# Patient Record
Sex: FEMALE | Race: WHITE | NOT HISPANIC OR LATINO | Employment: OTHER | URBAN - NONMETROPOLITAN AREA
[De-identification: names, ages, dates, MRNs, and addresses within clinical notes are randomized per-mention and may not be internally consistent; named-entity substitution may affect disease eponyms.]

---

## 2023-02-09 ENCOUNTER — OFFICE VISIT (OUTPATIENT)
Dept: PULMONOLOGY | Facility: CLINIC | Age: 73
End: 2023-02-09
Payer: MEDICARE

## 2023-02-09 VITALS
DIASTOLIC BLOOD PRESSURE: 88 MMHG | HEIGHT: 63 IN | OXYGEN SATURATION: 96 % | SYSTOLIC BLOOD PRESSURE: 118 MMHG | BODY MASS INDEX: 18.07 KG/M2 | TEMPERATURE: 97.8 F | HEART RATE: 92 BPM | WEIGHT: 102 LBS

## 2023-02-09 DIAGNOSIS — R63.6 UNDERWEIGHT: ICD-10-CM

## 2023-02-09 DIAGNOSIS — J44.9 CHRONIC OBSTRUCTIVE PULMONARY DISEASE, UNSPECIFIED COPD TYPE: Primary | ICD-10-CM

## 2023-02-09 DIAGNOSIS — R91.8 LUNG MASS: ICD-10-CM

## 2023-02-09 PROCEDURE — 99204 OFFICE O/P NEW MOD 45 MIN: CPT | Performed by: NURSE PRACTITIONER

## 2023-02-09 PROCEDURE — 3023F SPIROM DOC REV: CPT | Performed by: NURSE PRACTITIONER

## 2023-02-09 RX ORDER — HYDROCHLOROTHIAZIDE 12.5 MG/1
12.5 TABLET ORAL DAILY
COMMUNITY

## 2023-02-09 RX ORDER — ALPRAZOLAM 0.25 MG/1
TABLET ORAL
COMMUNITY
Start: 2023-01-13

## 2023-02-09 RX ORDER — ERGOCALCIFEROL 1.25 MG/1
CAPSULE ORAL
COMMUNITY

## 2023-02-09 RX ORDER — POTASSIUM CHLORIDE 20 MEQ/1
TABLET, EXTENDED RELEASE ORAL
COMMUNITY

## 2023-02-09 RX ORDER — DILTIAZEM HYDROCHLORIDE 180 MG/1
CAPSULE, COATED, EXTENDED RELEASE ORAL
COMMUNITY
Start: 2023-02-03

## 2023-02-09 RX ORDER — OXYCODONE AND ACETAMINOPHEN 7.5; 325 MG/1; MG/1
TABLET ORAL
COMMUNITY
Start: 2023-01-13

## 2023-02-09 RX ORDER — ALBUTEROL SULFATE 90 UG/1
AEROSOL, METERED RESPIRATORY (INHALATION)
COMMUNITY

## 2023-02-09 RX ORDER — BUDESONIDE AND FORMOTEROL FUMARATE DIHYDRATE 160; 4.5 UG/1; UG/1
AEROSOL RESPIRATORY (INHALATION)
COMMUNITY
Start: 2023-01-13

## 2023-02-09 RX ORDER — PANTOPRAZOLE SODIUM 40 MG/1
TABLET, DELAYED RELEASE ORAL
COMMUNITY

## 2023-02-09 RX ORDER — IPRATROPIUM/ALBUTEROL SULFATE 20-100 MCG
MIST INHALER (GRAM) INHALATION
COMMUNITY

## 2023-02-09 RX ORDER — LISINOPRIL 20 MG/1
TABLET ORAL
COMMUNITY
Start: 2023-01-13

## 2023-02-09 RX ORDER — MELOXICAM 15 MG/1
TABLET ORAL
COMMUNITY
Start: 2023-01-13

## 2023-02-09 NOTE — PROGRESS NOTES
Chief Complaint  Abnormal CT    Subjective        Cecilia Ordoñez presents to [facility] PULMONARY & CRITICAL CARE MEDICINE  History of Present Illness     Ms. Ordoñez is a 72 year old female with a medical history significant for anxiety, GERD, rheumatoid arthritis, COPD, rectal and vaginal squamous cell carcinoma.    She presents today for evaluation of an abnormal CT chest.  I have obtained records for review.  It is noted that the patient was hospitalized in October 2022 due to elevated CPK level and elevated creatinine.  She has been found at home on the floor by her son.  She had stated at this time that she had some nausea, vomiting and diarrhea prior to being admitted to the hospital.  During her hospitalization she was found to be hypoxic and was felt to have pneumonia noted on chest x-ray.  Also noted on chest x-ray at the time of hospitalization was a 1.8 cm pulmonary nodule in the right upper lobe.  CT chest was obtained to better evaluate pneumonia and was noted to show multifocal bilateral pulmonary infiltrates with cavitation.  During her hospitalization she saw infectious disease as well as an oncologist who recommended to try to look into cavitary lesion noted on CT chest.  Pulmonology recommended bronchoscopy.  She underwent bronchoscopy on October 11, 2022 which showed no endobronchial lesions.  Mucous plugs were noted in the right lower lobe and left lower lobe.  They were removed and treated with multiple irrigations.  Patient underwent bilateral washings and brush biopsy of the right upper lobe.  Cytology of the right upper lobe washing showed no malignancy and no evidence of any fungal organisms as well as no evidence of acid-fast organisms.  Bilateral lung washings also showed negative for malignancy.  A third brushing of the right upper wound showed atypical epithelial cells.  At this time a CT-guided biopsy was recommended if clinically indicated after resolution of  "pneumonia.  She is presented today to follow-up on the outpatient basis for further follow-up on this lesion.  She has since underwent another chest x-ray on 1/13/2023 which showed that this lung nodule/cavitary lesion appears to be enlarging.  She reports that she is currently taking albuterol as needed, Symbicort twice daily and Combivent every 4 hours.  She reports that she does have shortness of breath but that is currently at baseline.  She also reports recurrence of her rectal and vaginal squamous cell carcinoma.  She states that she has been following with Dr. Murguia for [facility] but has not received any treatment at this time.  She is a current smoker of about 1 pack/day.    Objective   Vital Signs:  /88   Pulse 92   Temp 97.8 °F (36.6 °C) (Temporal)   Ht 160 cm (63\")   Wt 46.3 kg (102 lb)   SpO2 96%   BMI 18.07 kg/m²   Estimated body mass index is 18.07 kg/m² as calculated from the following:    Height as of this encounter: 160 cm (63\").    Weight as of this encounter: 46.3 kg (102 lb).       BMI is below normal parameters (malnutrition). Recommendations: treating the underlying disease process      Physical Exam     GENERAL APPEARANCE: Well developed, Chronically ill appearing, alert and cooperative, and appears to be in no acute distress.    HEAD: normocephalic. Atraumatic.    EYES: PERRL, EOMI. Vision is grossly intact.    THROAT: Oral cavity and pharynx normal. No inflammation, swelling, exudate, or lesions.     NECK: Neck supple.  No thyromegaly.    CARDIAC: Normal S1 and S2. No S3, S4 or murmurs. Rhythm is regular.     RESPIRATORY:Bilateral air entry positive. Bilateral diminished breath sounds. No wheezing, crackles or rhonchi noted.    GI: Positive bowel sounds. Soft, nondistended, nontender.     MUSCULOSKELETAL: No significant deformity or joint abnormality. No edema. Peripheral pulses intact. No varicosities.    NEUROLOGICAL: Strength and sensation symmetric and intact " throughout.     PSYCHIATRIC: The mental examination revealed the patient was oriented to person, place, and time.     Result Review :  The following data was reviewed by: SILVIA Gagnon on 02/09/2023:                   Assessment and Plan   Diagnoses and all orders for this visit:    1. Chronic obstructive pulmonary disease, unspecified COPD type (HCC) (Primary)    2. Lung mass  -     NM PET/CT Skull Base to Mid Thigh; Future    3. Underweight        Spirometry was completed in office.  FEV1/FVC ratio was noted to be 79.  FEV1 was noted to be 67%.  Study is likely inaccurate as patient does not breathing out for 6 seconds.    After review of her records believe that it is best to approach with a PET/CT for further evaluation.  If PET/CT should come back positive we will consider obtaining a CT-guided biopsy.    She is a current smoker of 1 pack/day and states that she has cut down from 3 packs/day.    Cecilia Ordoñez  reports that she has been smoking cigarettes. She has been smoking an average of 1 pack per day. She has never used smokeless tobacco.. I have educated her on the risk of diseases from using tobacco products such as cancer, COPD and heart disease.     I advised her to quit and she is not willing to quit.    Continue Symbicort twice daily.  Continue Combivent every 4 hours.  Continue albuterol as needed.       Follow Up   Return in about 6 weeks (around 3/23/2023).  Patient was given instructions and counseling regarding her condition or for health maintenance advice. Please see specific information pulled into the AVS if appropriate.

## 2023-02-10 ENCOUNTER — PATIENT ROUNDING (BHMG ONLY) (OUTPATIENT)
Dept: PULMONOLOGY | Facility: CLINIC | Age: 73
End: 2023-02-10
Payer: MEDICARE

## 2023-02-10 NOTE — PROGRESS NOTES
February 10, 2023    Hello, may I speak with Cecilia Ordoñez?    My name is Rubina Cabrera      I am  with [facility]  [address]  [place] [address] [phone].    Before we get started may I verify your date of birth? 1950    I am calling to officially welcome you to our practice and ask about your recent visit. Is this a good time to talk? yes    Tell me about your visit with us. What things went well?  It was a nice visit, everyone was kind        We're always looking for ways to make our patients' experiences even better. Do you have recommendations on ways we may improve?  no    Overall were you satisfied with your first visit to our practice? yes       I appreciate you taking the time to speak with me today. Is there anything else I can do for you? no      Thank you, and have a great day.

## 2023-03-07 ENCOUNTER — HOSPITAL ENCOUNTER (OUTPATIENT)
Dept: PET IMAGING | Facility: HOSPITAL | Age: 73
Discharge: HOME | End: 2023-03-07
Admitting: NURSE PRACTITIONER
Payer: MEDICARE

## 2023-03-07 DIAGNOSIS — R91.8 LUNG MASS: ICD-10-CM

## 2023-03-07 PROCEDURE — A9552 F18 FDG: HCPCS | Performed by: NURSE PRACTITIONER

## 2023-03-07 PROCEDURE — 0 FLUDEOXYGLUCOSE F18 SOLUTION: Performed by: NURSE PRACTITIONER

## 2023-03-07 PROCEDURE — 78815 PET IMAGE W/CT SKULL-THIGH: CPT

## 2023-03-07 PROCEDURE — 78815 PET IMAGE W/CT SKULL-THIGH: CPT | Performed by: RADIOLOGY

## 2023-03-07 RX ADMIN — FLUDEOXYGLUCOSE F18 1 DOSE: 300 INJECTION INTRAVENOUS at 11:47

## 2023-03-20 ENCOUNTER — OFFICE VISIT (OUTPATIENT)
Dept: PULMONOLOGY | Facility: CLINIC | Age: 73
End: 2023-03-20
Payer: MEDICARE

## 2023-03-20 VITALS
TEMPERATURE: 98 F | HEART RATE: 79 BPM | RESPIRATION RATE: 18 BRPM | OXYGEN SATURATION: 92 % | SYSTOLIC BLOOD PRESSURE: 100 MMHG | DIASTOLIC BLOOD PRESSURE: 80 MMHG

## 2023-03-20 DIAGNOSIS — C44.92 SQUAMOUS CELL SKIN CANCER: ICD-10-CM

## 2023-03-20 DIAGNOSIS — R91.8 LUNG MASS: Primary | ICD-10-CM

## 2023-03-20 DIAGNOSIS — J96.11 CHRONIC RESPIRATORY FAILURE WITH HYPOXIA: ICD-10-CM

## 2023-03-20 DIAGNOSIS — J44.9 COPD MIXED TYPE: ICD-10-CM

## 2023-03-20 DIAGNOSIS — R06.09 DOE (DYSPNEA ON EXERTION): ICD-10-CM

## 2023-03-20 DIAGNOSIS — Z72.0 TOBACCO ABUSE: ICD-10-CM

## 2023-03-20 PROBLEM — E43 SEVERE MALNUTRITION: Status: ACTIVE | Noted: 2023-03-20

## 2023-03-20 PROCEDURE — 1159F MED LIST DOCD IN RCRD: CPT | Performed by: INTERNAL MEDICINE

## 2023-03-20 PROCEDURE — 99215 OFFICE O/P EST HI 40 MIN: CPT | Performed by: INTERNAL MEDICINE

## 2023-03-20 PROCEDURE — 1160F RVW MEDS BY RX/DR IN RCRD: CPT | Performed by: INTERNAL MEDICINE

## 2023-03-20 NOTE — PROGRESS NOTES
"  PULMONARY  NOTE    Chief Complaint     Hypermetabolic lung lesion, severe COPD, ongoing tobacco abuse, severe malnutrition, debility, dyspnea on exertion, history of squamous cell carcinoma of the rectum and vagina    History of Present Illness     72-year-old female returns today for follow-up  She last saw SILVIA Fernando on 2/9/2023    She has a long history of tobacco abuse which is ongoing  She is cut down to 1 pack/day and at this time does not have plans for smoking cessation    Last fall she was found to have a 1.8 cm right upper lobe lesion  She underwent a bronchoscopy which revealed no endobronchial lesions and a brush from the right upper lobe revealed atypia    By her report she has a history of squamous cell cancer of her \"groin\" that has invaded her rectum and her vagina  She has undergone excision at [facility] in the past  She had recurrence after about a year and has been followed by Dr. Murguia in [place] who has apparently done some excisions in the past, as well  She has not recently been back to see Dr. Murguia but complains of increasing pain and skin changes in her groin    After seeing SILVIA Fernando the patient underwent a PET CT scan  Those results are as noted below    She has lifestyle limiting dyspnea  Also chronic groin and hip pain  For those reasons she gets around in a wheelchair any distance    Patient Active Problem List   Diagnosis   • Severe COPD   • Tobacco abuse   • RUL hypermetabolic lung mass (Atypia on bronch brush)   • SANTILLAN (dyspnea on exertion)   • Squamous cell skin (?) cancer with vaginal, rectal involvement   • Severe malnutrition (HCC)   • Chronic respiratory failure with hypoxia (HCC)     Allergies   Allergen Reactions   • Androgens Hives   • Aspirin Nausea And Vomiting   • Penicillins Rash   • Prednisone & Diphenhydramine Hives   • Sulfa Antibiotics Rash and Hives       Current Outpatient Medications:   •  albuterol sulfate  (90 Base) MCG/ACT " inhaler, albuterol sulfate HFA 90 mcg/actuation aerosol inhaler, Disp: , Rfl:   •  ALPRAZolam (XANAX) 0.25 MG tablet, alprazolam 0.25 mg tablet, Disp: , Rfl:   •  budesonide-formoterol (SYMBICORT) 160-4.5 MCG/ACT inhaler, Q12H, Disp: , Rfl:   •  dilTIAZem CD (CARDIZEM CD) 180 MG 24 hr capsule, , Disp: , Rfl:   •  ergocalciferol (ERGOCALCIFEROL) 1.25 MG (23767 UT) capsule, ergocalciferol (vitamin D2) 1,250 mcg (50,000 unit) capsule  Take 1 capsule twice a week by oral route for 28 days., Disp: , Rfl:   •  hydroCHLOROthiazide (HYDRODIURIL) 12.5 MG tablet, Take 1 tablet by mouth Daily., Disp: , Rfl:   •  ipratropium-albuterol (Combivent Respimat)  MCG/ACT inhaler, Combivent Respimat 20 mcg-100 mcg/actuation solution for inhalation, Disp: , Rfl:   •  lisinopril (PRINIVIL,ZESTRIL) 20 MG tablet, lisinopril 20 mg tablet, Disp: , Rfl:   •  meloxicam (MOBIC) 15 MG tablet, meloxicam 15 mg tablet, Disp: , Rfl:   •  metoprolol tartrate (LOPRESSOR) 25 MG tablet, Every 12 (Twelve) Hours., Disp: , Rfl:   •  oxyCODONE-acetaminophen (PERCOCET) 7.5-325 MG per tablet, oxycodone-acetaminophen 7.5 mg-325 mg tablet, Disp: , Rfl:   •  pantoprazole (PROTONIX) 40 MG EC tablet, pantoprazole 40 mg tablet,delayed release, Disp: , Rfl:   •  potassium chloride (K-DUR,KLOR-CON) 20 MEQ CR tablet, potassium chloride ER 20 mEq tablet,extended release(part/cryst), Disp: , Rfl:   MEDICATION LIST AND ALLERGIES REVIEWED.    Family History   Problem Relation Age of Onset   • Stroke Mother    • Heart failure Father    • Cancer Paternal Aunt    • Cancer Maternal Grandmother      Social History     Tobacco Use   • Smoking status: Every Day     Packs/day: 1.00     Types: Cigarettes   • Smokeless tobacco: Never   Vaping Use   • Vaping Use: Never used   Substance Use Topics   • Alcohol use: Never   • Drug use: Never     Social History     Social History Narrative    Ongoing tobacco abuse    Cut down to 1 pack/day    No plans for smoking cessation      FAMILY AND SOCIAL HISTORY REVIEWED.    Review of Systems  ALSO REFER TO SCANNED ROS SHEET FROM SAME DATE.    /80   Pulse 79   Temp 98 °F (36.7 °C) (Temporal)   Resp 18   SpO2 92%   Physical Exam  Vitals and nursing note reviewed.   Constitutional:       General: She is not in acute distress.     Appearance: She is well-developed. She is not diaphoretic.   HENT:      Head: Normocephalic and atraumatic.   Neck:      Thyroid: No thyromegaly.   Cardiovascular:      Rate and Rhythm: Normal rate and regular rhythm.      Heart sounds: Normal heart sounds. No murmur heard.  Pulmonary:      Effort: Pulmonary effort is normal.      Breath sounds: No stridor.      Comments: Decreased breath sounds bilaterally with a few scattered wheezes  Lymphadenopathy:      Cervical: No cervical adenopathy.      Upper Body:      Right upper body: No supraclavicular or epitrochlear adenopathy.      Left upper body: No supraclavicular or epitrochlear adenopathy.   Skin:     General: Skin is warm and dry.   Neurological:      Mental Status: She is alert and oriented to person, place, and time.      Comments: Got around the office today in a wheelchair due to dyspnea and fatigue   Psychiatric:         Behavior: Behavior normal.         Results     PET CT scan reviewed on PACS  Intensely hypermetabolic left upper lobe lesion    Previous PFTs revealed an FEV1 of 1.35 L    Immunization History   Administered Date(s) Administered   • COVID-19 (DAPHNE) 04/07/2021   • Pneumococcal Conjugate 20-Valent (PCV20) 09/01/2022   • Td 05/20/2010, 03/04/2021   • Td, Not Adsorbed 03/04/2021     Problem List       ICD-10-CM ICD-9-CM   1. RUL hypermetabolic lung mass (Atypia on bronch brush)  R91.8 786.6   2. Severe COPD  J44.9 496   3. Chronic respiratory failure with hypoxia (HCC)  J96.11 518.83     799.02   4. SANTILLAN (dyspnea on exertion)  R06.09 786.09   5. Squamous cell skin (?) cancer with vaginal, rectal involvement  C44.92 173.92   6. Tobacco  abuse  Z72.0 305.1       Discussion     We reviewed her PET scan and her test results to date    She has a approximately 2 cm right upper lobe lesion that exhibits intense abnormal hypermetabolic activity  Based on the [facility] lung nodule calculator there is a greater than 90% likelihood this represents a malignancy  The PET scan does not reveal abnormal hypermetabolic activity elsewhere    She has already undergone a routine bronchoscopy about 4 months ago which revealed atypia on a brush  This lesion would probably be amenable to a percutaneous needle biopsy but given her tenuous respiratory status a iatrogenic pneumothorax would carry a high likelihood for morbidity  A repeat bronchoscopy could be considered but this would need to be done with navigational assistance under general anesthesia and in [place] and would also be at significant risks given her debilitated state and advanced stage lung disease  Therefore I would recommend referral to radiation oncology and consideration of empiric SBRT    She has a lot of pain in her groin region which I did not examine today  The PET scan did not identify any focal abnormal hypermetabolic activity in this area  I have strongly encouraged her to follow-up with Dr. Murguia  Even if she does have regional recurrence of squamous cell carcinoma in her groin I think that the lung lesion is unlikely to be a metastatic focus    I strongly encouraged her smoking cessation we discussed smoking cessation strategies    She will remain on the same bronchodilators    I will see her back in a couple of months after she has been seen by Dr. Murguia and radiation oncology    Level of service justified based on 45 minutes spent in patient care on this date of service including, but not limited to: preparing to see the patient, obtaining and/or reviewing history, performing medically appropriate examination, ordering tests/medicine/procedures, independently interpreting results,  documenting clinical information in EHR, and counseling/education of patient/family/caregiver (excluding time spent on other separate services such as performing procedures or test interpretation, if applicable). (Level 4 30-39 minutes; Level 5 40-54 minutes)    Manuel Cramer MD  Note electronically signed    CC: Kathryn Schmitt, APRN

## 2023-03-28 ENCOUNTER — TELEPHONE (OUTPATIENT)
Dept: RADIATION ONCOLOGY | Facility: HOSPITAL | Age: 73
End: 2023-03-28
Payer: MEDICARE

## 2023-03-28 NOTE — TELEPHONE ENCOUNTER
Called and made patient aware of her Consult appointment on April 6th at 0900. She is aware that new patient paperwork has been mailed out, she can fill that out and bring with her to her appointment. She expressed full understanding with no questions at this time.

## 2023-04-06 ENCOUNTER — APPOINTMENT (OUTPATIENT)
Dept: RADIATION ONCOLOGY | Facility: HOSPITAL | Age: 73
End: 2023-04-06
Payer: MEDICARE

## 2023-04-06 ENCOUNTER — CONSULT (OUTPATIENT)
Dept: RADIATION ONCOLOGY | Facility: HOSPITAL | Age: 73
End: 2023-04-06
Payer: MEDICARE

## 2023-04-06 VITALS
OXYGEN SATURATION: 95 % | SYSTOLIC BLOOD PRESSURE: 85 MMHG | TEMPERATURE: 97.3 F | BODY MASS INDEX: 17.36 KG/M2 | RESPIRATION RATE: 18 BRPM | DIASTOLIC BLOOD PRESSURE: 58 MMHG | WEIGHT: 98 LBS | HEART RATE: 60 BPM

## 2023-04-06 DIAGNOSIS — R91.8 LUNG MASS: Primary | ICD-10-CM

## 2023-04-06 PROCEDURE — 1125F AMNT PAIN NOTED PAIN PRSNT: CPT | Performed by: RADIOLOGY

## 2023-04-06 PROCEDURE — 77263 THER RADIOLOGY TX PLNG CPLX: CPT | Performed by: RADIOLOGY

## 2023-04-06 PROCEDURE — 77334 RADIATION TREATMENT AID(S): CPT | Performed by: RADIOLOGY

## 2023-04-06 PROCEDURE — 99204 OFFICE O/P NEW MOD 45 MIN: CPT | Performed by: RADIOLOGY

## 2023-04-06 PROCEDURE — G0463 HOSPITAL OUTPT CLINIC VISIT: HCPCS | Performed by: RADIOLOGY

## 2023-04-06 NOTE — PROGRESS NOTES
New Patient Office Consult      Patient Name: Cecilia Ordoñez  : 1950   MRN: [id number]     Requesting Physician: Manuel Cramer,*    Chief Complaint:    Chief Complaint   Patient presents with   • Lung Cancer     Lung Mass    Clinical diagnosis of non-small cell lung cancer presenting as a right upper lobe peripheral nodule  Pathology: * Cannot find OR log *   Staging: [id number] stage 1     History of Present Illness: Cecilia Ordoñez is a pleasant 72 y.o. female who is here today for consultation regarding a newly discovered right lobe upper lobe nodule as detailed below.    Patient is a lifelong heavy smoker usually 2 to 3 packs/day and now down to about 1/2 pack/day but with no interest in quitting entirely.    On 2022 she was found by a neighbor on her floor complaining of nausea vomiting and diarrhea.  She was hospitalized and found to be hypoxic.  As part of her work-up a chest x-ray was obtained significant for 1.8 cm right upper lung nodule peripherally located.  This was followed by a chest CT which showed multifocal bilateral lung infiltrates with cavitation.    On  patient was seen by Dr. Jim, pulmonary service and infectious disease.  The oncologist recommended closer look at her cavitary lesion    Patient underwent bronchoscopy on 2022.  Koska B showed no endobronchial lesions kristi was sharp mucous plugs in the right lower lobe and the left lower lobe were removed and treated with irrigations.  Bilateral washings and brush biopsy were performed.  Cytology from the right upper lobe washing showed no malignancy and no evidence of fungal organisms.  A third brushing of the right upper lung however showed atypical epithelial cells present favor reactive bronchial epithelial cells.    CT-guided biopsy was recommended if clinically indicated after resolution of pneumonia.  She was discharged on 10/18/2022 with diagnosis of septic shock on admission due to  bacterial pneumonia.    Chest x-ray on 1/13/2023 showed redemonstration of peripheral right lung nodule suspicious for neoplasm PFTs obtained on 2/9/2023 showed an FEV1 of 67%, FEV1 over FVC of 0.79.    On 3/7/2023 patient underwent PET examination at [facility] in [place].  Exam was notable for spiculated right upper lung pulmonary nodule now 19 mm in size avid with a max SUV of 7.9 concerning for malignancy.  Other nodules including a spiculated right upper lobe subpleural nodule measuring 11 mm showed no hypermetabolism and may represent fibrosis.  More ill-defined right lower lobe nodule measuring 17 mm again had no hypermetabolism and again was thought to represent fibrosis.  A 1 cm spiculated left lower lobe nodule again showed no hypermetabolism.  Therefore the only avid area was the 19 mm right upper lung nodule peripherally located.    Patient relates that currently she is back to baseline she is diminished her smoking to half pack per day she is largely confined to a sitting position with chronic groin and hip pain use a wheelchair if she needs to look about any distance.    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 Red in [facility].  She has an appointment to see him again the 20 of this month and if contextually appropriate can consider radiation treatment.    Subjective      Review of Systems:      Constitutional no sweats stable weakness, largely wheelchair-bound when needs to look about any distance due to pain in groin and hip respiratory stable chronic shortness of breath with no recent exacerbation of the last month she denies pleuritic or constant chest pain she denies current cough or hemoptysis cardiovascular she denies palpitations chest pain change in heart rhythm syncope GI she denies current diarrhea vomiting constipation heartburn nausea  genitourinary she denies hematuria increased urinary frequency dysuria gynecological she is aware of recurrence of vaginal cancer as detailed above being managed by Dr. Red in [place] she does have pain but denies foul odor or discharge endocrine she denies cold intolerance heat intolerance excess thirst excess hunger musculoskeletal she has chronic pain in the left hip at a modest extent in the pubic region she denies claudication muscle pain decreased range of motion no history of trauma neurologic she is alert and oriented x4 she denies current problems with headache seizure activities change in mentation vision hearing.            I have reviewed and confirmed the accuracy of the ROS as documented by the MA/LPN/RN Orestes Lunsford MD     Past Oncology History:   Oncology/Hematology History    No history exists.        Past Radiation History:  No previous exposures to ionizing radiation therapy and there are not relative contraindications including connective tissue disorder.     Past Medical History:   Past Medical History:   Diagnosis Date   • Anxiety disorder    • GERD (gastroesophageal reflux disease)    • Gout    • Osteoarthritis    • Rheumatoid arthritis    • Skin cancer        Past Surgical History:   Past Surgical History:   Procedure Laterality Date   • BACK SURGERY     • BLADDER REPAIR     • HYSTERECTOMY     • SHOULDER SURGERY         Family History:   Family History   Problem Relation Age of Onset   • Stroke Mother    • Heart failure Father    • Cancer Paternal Aunt    • Cancer Maternal Grandmother        Social History:   Social History     Socioeconomic History   • Marital status:    Tobacco Use   • Smoking status: Every Day     Packs/day: 1.00     Types: Cigarettes   • Smokeless tobacco: Never   Vaping Use   • Vaping Use: Never used   Substance and Sexual Activity   • Alcohol use: Never   • Drug use: Never   • Sexual activity: Defer       Medications:     Current Outpatient  Medications:   •  albuterol sulfate  (90 Base) MCG/ACT inhaler, albuterol sulfate HFA 90 mcg/actuation aerosol inhaler, Disp: , Rfl:   •  ALPRAZolam (XANAX) 0.25 MG tablet, alprazolam 0.25 mg tablet, Disp: , Rfl:   •  budesonide-formoterol (SYMBICORT) 160-4.5 MCG/ACT inhaler, Q12H, Disp: , Rfl:   •  dilTIAZem CD (CARDIZEM CD) 180 MG 24 hr capsule, , Disp: , Rfl:   •  ergocalciferol (ERGOCALCIFEROL) 1.25 MG (83742 UT) capsule, ergocalciferol (vitamin D2) 1,250 mcg (50,000 unit) capsule  Take 1 capsule twice a week by oral route for 28 days., Disp: , Rfl:   •  hydroCHLOROthiazide (HYDRODIURIL) 12.5 MG tablet, Take 1 tablet by mouth Daily., Disp: , Rfl:   •  ipratropium-albuterol (Combivent Respimat)  MCG/ACT inhaler, Combivent Respimat 20 mcg-100 mcg/actuation solution for inhalation, Disp: , Rfl:   •  lisinopril (PRINIVIL,ZESTRIL) 20 MG tablet, lisinopril 20 mg tablet, Disp: , Rfl:   •  meloxicam (MOBIC) 15 MG tablet, meloxicam 15 mg tablet, Disp: , Rfl:   •  metoprolol tartrate (LOPRESSOR) 25 MG tablet, Every 12 (Twelve) Hours., Disp: , Rfl:   •  oxyCODONE-acetaminophen (PERCOCET) 7.5-325 MG per tablet, oxycodone-acetaminophen 7.5 mg-325 mg tablet, Disp: , Rfl:   •  pantoprazole (PROTONIX) 40 MG EC tablet, pantoprazole 40 mg tablet,delayed release, Disp: , Rfl:   •  potassium chloride (K-DUR,KLOR-CON) 20 MEQ CR tablet, potassium chloride ER 20 mEq tablet,extended release(part/cryst), Disp: , Rfl:     Allergies:   Allergies   Allergen Reactions   • Androgens Hives   • Aspirin Nausea And Vomiting   • Penicillins Rash   • Prednisone & Diphenhydramine Hives   • Sulfa Antibiotics Rash and Hives       KPS:70 %       Objective     Physical Exam:  Patient is extremely frail appearing elderly woman sitting in wheelchair.  She reports she is unable to climb on exam table and wishes examination today to be limited to the chest and abdomen pelvis as Dr. Red is handling issues relating to to her groin and  vagina.  She is examined with her son in attendance as chaperone    She is alert and oriented x3 HEENT normocephalic EOMs are full visual fields are full neck is supple there is no thyromegaly chest sounds are markedly diminished in all lung fields there is no DAO changes this no rubs there is no crackles.  There is no palpation percussion tenderness over the thoracic spine breasts are small nipples are everted there is no untoward masses in either breast or discharge there is no S3-S4 murmur rub is no hepatosplenomegaly no abdominal mass no JV distention no extremity edema cranial nerves II through XII are intact motor and sensory exams is grossly intact but I did not get her up to walk.      Vital Signs:   Vitals:    04/06/23 0850   BP: (!) 85/58   Pulse: 60   Resp: 18   Temp: 97.3 °F (36.3 °C)   TempSrc: Temporal   SpO2: 95%   Weight: 44.5 kg (98 lb)  Comment: Pt having hip pain; Stated weight from 2 days ago   PainSc: 10-Worst pain ever   PainLoc: Hip     Body mass index is 17.36 kg/m².       Assessment / Plan      Assessment/Plan:   No notes have been filed under this hospital service.  Service: Hospitalist       1. Our discussion today basically focused on her new right upper lung nodule.  I did explain however that after seeing Dr. Red on the 20th of this month and if indeed there is a recurrence of vaginal cancer then contextually she could be referred back to the radiation department for consideration of palliative radiotherapy to this region.  Therefore she is aware of this option for the future.  Of note her recent PET scan did not show any hypermetabolism in the vaginal groin area.    With respect to the right upper lung peripheral nodule R context is a woman who is quite frail with marginal PFTs who is clearly per Dr. Maria not considered a surgical candidate nor a safe candidate for further biopsy endeavors consequent risk of morbidity and mortality.    We therefore discussed 3 approaches first  proceeding nonetheless with further biopsy to pin down a diagnosis of non-small cell lung cancer of the right upper lung.  I agree with Dr. Maria that given the PET avidity of 7.9 in the right upper lung 19 mm nodule region combined with the [facility] nodule calculator showing more than 90% chance that this represents malignancy, the atypical cells from the right upper lung brushing and the risk intent and further biopsy approach this would be a poor choice.  The chance is overwhelming in context that this is a true malignancy but patient and son both understand that this has not been verified.    This being the case are to I believe realistic options are either proceed now with SBRT and ablate the small lesion versus close observation given her overall frail status with multiple comorbidities and do repetitive noncontrast CT scans every few months and a [place] with SBRT should it start to grow..  I believe both approaches would be reasonable in context.  After extensive discussion patient opted to treat at the present time and I think that is certainly reasonable choice.    I thus went over with her the alternatives pragmatics and possible short long-term side effects attending a course of SBRT.  Recommend that she receive 5 treatments given on an every other day basis to this region plus a small safety margin usually 5 to 8 mm depending on its movement as ascertained during the treatment planning process.  I have spoken to the physicist and dosimetrist here to make sure we conserve as much lung tissue as possible given her marginal reserves.  During treatment she may experience body aches and soreness from lying flat for 30 minutes on 5 occasions on a hard table, she may experience mild fatigue and mild skin redness.  Of more concern is the risk of radiation pneumonitis which I explained to her and in rare cases can be lethal side effect typically it is manifest a few weeks to few months after the radiation  and can show itself as temperature elevation cough and a washed out feeling.  Is also possible to have unrelenting chest pain albeit rare and injury to great vessels depending on particulars of the treatment approach.  In context this kind of treatment is usually well-tolerated even by patients who have marginal pulmonary reserve and in some cases that reserve can actually improve slightly as blood is diverted from narrating regions of malignancy to vital areas of lung tissue.  The most likely outcome is that she will not notice any difference in terms of her exercise capacity which again is marginal.    After extensive discussion patient and son would like to proceed with treatment they signed the consent will be simulated today to start the planning process.  Thank you for including us in her care.  We remain available to see her for the vaginal problem at the request of Dr. Red with with patient and son agree.      Follow Up:   No follow-ups on file.    Orestes Lunsford MD  [facility] [place]

## 2023-04-10 ENCOUNTER — DOCUMENTATION (OUTPATIENT)
Dept: ONCOLOGY | Facility: HOSPITAL | Age: 73
End: 2023-04-10
Payer: MEDICARE

## 2023-04-10 PROCEDURE — 77301 RADIOTHERAPY DOSE PLAN IMRT: CPT | Performed by: RADIOLOGY

## 2023-04-10 PROCEDURE — 77338 DESIGN MLC DEVICE FOR IMRT: CPT | Performed by: RADIOLOGY

## 2023-04-10 PROCEDURE — 77300 RADIATION THERAPY DOSE PLAN: CPT | Performed by: RADIOLOGY

## 2023-04-10 PROCEDURE — 77293 RESPIRATOR MOTION MGMT SIMUL: CPT | Performed by: RADIOLOGY

## 2023-04-10 NOTE — PROGRESS NOTES
"Patient is 71 Y/O white female diagnosed with Clinical diagnosis of non-small cell lung cancer presenting as a right upper lobe peripheral nodule.    Patient in clinic on Wednesday, April 6, 2023 for radiation consultation with Dr. Orestes Lunsford.    SS received consult for social service assessment.     Referral  Received: 4 days ago  Marisela Velasco MA Taylor, Pamela F, MSW  Ambulatory Referral to ONC Social Work [[id number]]    Electronically signed by: Marisela Velasco MA on 04/06/23 0832 Status: Active   Ordering user: Marisela Velasco MA 04/06/23 0832 Ordering provider: Orestes Lunsford MD   Authorized by: Orestes Lunsford MD   Cosigning events   Electronically cosigned by Orestes Lunsford MD 04/07/23 0958 for Ordering   Frequency:  04/06/23 -     Diagnoses   Lung mass [R91.8]   Questionnaire    Question Answer   Follow-up needed: Yes     SS contacted patient [phone]. Role of oncology social worker introduced to patient.    Patient lives at home alone. Patient states that son Edgar visits daily and assist with care as needed.    Patient states that she was first diagnosed in 2007 and lived cancer free for over twelve years.    Patient doesn't utilize any home health.    Patient doesn't utilize any durable medical equipment.    Patient's son provides transportation.    Patient states having one living child: Edgar.    Advance Care Planning:  The patient and I discussed care planning \"Conversations that Matter.\" This service was offered, free of charge, for development of advance directives with a certified ACP facilitator. The patient does not have an up-to-date advance directive. The patient is not interested in an appointment with one of our facilitators to create or update their advanced directives.    Patient completed [facility] Distress Screening and scored herself a 9 out of 10.    Distress Screening Follow-up    Diagnosis: Lung Cancer    Location of Distress " Screening: Radiation Oncology    Distress Level: 9 (4/6/2023  9:00 AM)    Financial Needs: SSDI (Patient receives social security disability benefits.)  Transportation Needs: gas cards (SS completed application for [employer] travel assistance program and [facility] travel assistance.)  Emotional Needs: emotional suppport/coping strategies (Patient completed [facility] Distress Screening and scored herself a 9 out of 10. Patient declines supportive counseling services at this time.Resource information provided.)  Jainism Needs: other (comments) (Confucianism not discussed this date.)  Physical Needs: other (see comments) (Patient states being physically weak and fatigued.)  Palliative Care: advance care planning (Patient doesn't have a living will or power of .)    Time spent talking with patient, empathetic listening provided, and reassurance given.  Supportive care services offered and patient declined at this time.  Resources provided for assistance if needed.    Patient lives at [address] [place] [address] traveling approximately miles round trip.    SS completed application for [employer] travel assistance program.    SS completed application for [facility] for travel assistance and faxed [phone].    SS contact information provided to patient and son Edgar. SS encouraged patient and son to call with any questions or concerns.    SS will follow and assist as needed.

## 2023-04-17 ENCOUNTER — HOSPITAL ENCOUNTER (OUTPATIENT)
Dept: RADIATION ONCOLOGY | Facility: HOSPITAL | Age: 73
Discharge: HOME | End: 2023-04-17

## 2023-04-17 LAB
RAD ONC ARIA COURSE ID: NORMAL
RAD ONC ARIA COURSE INTENT: NORMAL
RAD ONC ARIA COURSE LAST TREATMENT DATE: NORMAL
RAD ONC ARIA COURSE START DATE: NORMAL
RAD ONC ARIA COURSE TREATMENT ELAPSED DAYS: 0
RAD ONC ARIA FIRST TREATMENT DATE: NORMAL
RAD ONC ARIA PLAN FRACTIONS TREATED TO DATE: 1
RAD ONC ARIA PLAN ID: NORMAL
RAD ONC ARIA PLAN PRESCRIBED DOSE PER FRACTION: 10 GY
RAD ONC ARIA PLAN PRIMARY REFERENCE POINT: NORMAL
RAD ONC ARIA PLAN TOTAL FRACTIONS PRESCRIBED: 5
RAD ONC ARIA PLAN TOTAL PRESCRIBED DOSE: 5000 CGY
RAD ONC ARIA REFERENCE POINT DOSAGE GIVEN TO DATE: 10 GY
RAD ONC ARIA REFERENCE POINT ID: NORMAL
RAD ONC ARIA REFERENCE POINT SESSION DOSAGE GIVEN: 10 GY

## 2023-04-17 PROCEDURE — 77373 STRTCTC BDY RAD THER TX DLVR: CPT | Performed by: RADIOLOGY

## 2023-04-17 PROCEDURE — 77336 RADIATION PHYSICS CONSULT: CPT | Performed by: RADIOLOGY

## 2023-04-18 ENCOUNTER — DOCUMENTATION (OUTPATIENT)
Dept: ONCOLOGY | Facility: HOSPITAL | Age: 73
End: 2023-04-18
Payer: MEDICARE

## 2023-04-18 PROCEDURE — 77435 SBRT MANAGEMENT: CPT | Performed by: RADIOLOGY

## 2023-04-18 NOTE — PROGRESS NOTES
SS received e-mail from Maddy Tinsley stating that [employer] gas card mailed today to patient's home address.    SS will follow.

## 2023-04-19 ENCOUNTER — HOSPITAL ENCOUNTER (OUTPATIENT)
Dept: RADIATION ONCOLOGY | Facility: HOSPITAL | Age: 73
Discharge: HOME | End: 2023-04-19

## 2023-04-19 LAB

## 2023-04-19 PROCEDURE — 77373 STRTCTC BDY RAD THER TX DLVR: CPT | Performed by: RADIOLOGY

## 2023-04-21 ENCOUNTER — HOSPITAL ENCOUNTER (OUTPATIENT)
Dept: RADIATION ONCOLOGY | Facility: HOSPITAL | Age: 73
Discharge: HOME | End: 2023-04-21
Payer: MEDICARE

## 2023-04-21 LAB
RAD ONC ARIA COURSE ID: NORMAL
RAD ONC ARIA COURSE INTENT: NORMAL
RAD ONC ARIA COURSE LAST TREATMENT DATE: NORMAL
RAD ONC ARIA COURSE START DATE: NORMAL
RAD ONC ARIA COURSE TREATMENT ELAPSED DAYS: 4
RAD ONC ARIA FIRST TREATMENT DATE: NORMAL
RAD ONC ARIA PLAN FRACTIONS TREATED TO DATE: 3
RAD ONC ARIA PLAN ID: NORMAL
RAD ONC ARIA PLAN PRESCRIBED DOSE PER FRACTION: 10 GY
RAD ONC ARIA PLAN PRIMARY REFERENCE POINT: NORMAL
RAD ONC ARIA PLAN TOTAL FRACTIONS PRESCRIBED: 5
RAD ONC ARIA PLAN TOTAL PRESCRIBED DOSE: 5000 CGY
RAD ONC ARIA REFERENCE POINT DOSAGE GIVEN TO DATE: 30 GY
RAD ONC ARIA REFERENCE POINT ID: NORMAL
RAD ONC ARIA REFERENCE POINT SESSION DOSAGE GIVEN: 10 GY

## 2023-04-21 PROCEDURE — 77373 STRTCTC BDY RAD THER TX DLVR: CPT | Performed by: RADIOLOGY

## 2023-04-24 ENCOUNTER — HOSPITAL ENCOUNTER (OUTPATIENT)
Dept: RADIATION ONCOLOGY | Facility: HOSPITAL | Age: 73
Discharge: HOME | End: 2023-04-24

## 2023-04-24 LAB
RAD ONC ARIA COURSE ID: NORMAL
RAD ONC ARIA COURSE INTENT: NORMAL
RAD ONC ARIA COURSE LAST TREATMENT DATE: NORMAL
RAD ONC ARIA COURSE START DATE: NORMAL
RAD ONC ARIA COURSE TREATMENT ELAPSED DAYS: 7
RAD ONC ARIA FIRST TREATMENT DATE: NORMAL
RAD ONC ARIA PLAN FRACTIONS TREATED TO DATE: 4
RAD ONC ARIA PLAN ID: NORMAL
RAD ONC ARIA PLAN PRESCRIBED DOSE PER FRACTION: 10 GY
RAD ONC ARIA PLAN PRIMARY REFERENCE POINT: NORMAL
RAD ONC ARIA PLAN TOTAL FRACTIONS PRESCRIBED: 5
RAD ONC ARIA PLAN TOTAL PRESCRIBED DOSE: 5000 CGY
RAD ONC ARIA REFERENCE POINT DOSAGE GIVEN TO DATE: 40 GY
RAD ONC ARIA REFERENCE POINT ID: NORMAL
RAD ONC ARIA REFERENCE POINT SESSION DOSAGE GIVEN: 10 GY

## 2023-04-24 PROCEDURE — 77373 STRTCTC BDY RAD THER TX DLVR: CPT | Performed by: RADIOLOGY

## 2023-04-26 ENCOUNTER — HOSPITAL ENCOUNTER (OUTPATIENT)
Dept: RADIATION ONCOLOGY | Facility: HOSPITAL | Age: 73
Discharge: HOME | End: 2023-04-26

## 2023-04-26 VITALS
SYSTOLIC BLOOD PRESSURE: 107 MMHG | HEART RATE: 57 BPM | OXYGEN SATURATION: 95 % | RESPIRATION RATE: 20 BRPM | DIASTOLIC BLOOD PRESSURE: 74 MMHG | TEMPERATURE: 97.1 F

## 2023-04-26 LAB
RAD ONC ARIA COURSE ID: NORMAL
RAD ONC ARIA COURSE INTENT: NORMAL
RAD ONC ARIA COURSE LAST TREATMENT DATE: NORMAL
RAD ONC ARIA COURSE START DATE: NORMAL
RAD ONC ARIA COURSE TREATMENT ELAPSED DAYS: 9
RAD ONC ARIA FIRST TREATMENT DATE: NORMAL
RAD ONC ARIA PLAN FRACTIONS TREATED TO DATE: 5
RAD ONC ARIA PLAN ID: NORMAL
RAD ONC ARIA PLAN PRESCRIBED DOSE PER FRACTION: 10 GY
RAD ONC ARIA PLAN PRIMARY REFERENCE POINT: NORMAL
RAD ONC ARIA PLAN TOTAL FRACTIONS PRESCRIBED: 5
RAD ONC ARIA PLAN TOTAL PRESCRIBED DOSE: 5000 CGY
RAD ONC ARIA REFERENCE POINT DOSAGE GIVEN TO DATE: 50 GY
RAD ONC ARIA REFERENCE POINT ID: NORMAL
RAD ONC ARIA REFERENCE POINT SESSION DOSAGE GIVEN: 10 GY

## 2023-04-26 PROCEDURE — 77373 STRTCTC BDY RAD THER TX DLVR: CPT | Performed by: RADIOLOGY

## 2023-04-28 LAB
RAD ONC ARIA COURSE END DATE: NORMAL
RAD ONC ARIA COURSE ID: NORMAL
RAD ONC ARIA COURSE INTENT: NORMAL
RAD ONC ARIA COURSE LAST TREATMENT DATE: NORMAL
RAD ONC ARIA COURSE START DATE: NORMAL
RAD ONC ARIA COURSE TREATMENT ELAPSED DAYS: 9
RAD ONC ARIA FIRST TREATMENT DATE: NORMAL
RAD ONC ARIA PLAN FRACTIONS TREATED TO DATE: 5
RAD ONC ARIA PLAN ID: NORMAL
RAD ONC ARIA PLAN NAME: NORMAL
RAD ONC ARIA PLAN PRESCRIBED DOSE PER FRACTION: 10 GY
RAD ONC ARIA PLAN PRIMARY REFERENCE POINT: NORMAL
RAD ONC ARIA PLAN TOTAL FRACTIONS PRESCRIBED: 5
RAD ONC ARIA PLAN TOTAL PRESCRIBED DOSE: 5000 CGY
RAD ONC ARIA REFERENCE POINT DOSAGE GIVEN TO DATE: 50 GY
RAD ONC ARIA REFERENCE POINT ID: NORMAL

## 2023-05-18 ENCOUNTER — HOSPITAL ENCOUNTER (OUTPATIENT)
Dept: RADIATION ONCOLOGY | Facility: HOSPITAL | Age: 73
Setting detail: RADIATION/ONCOLOGY SERIES
End: 2023-05-18
Payer: MEDICARE

## 2023-05-19 ENCOUNTER — OFFICE VISIT (OUTPATIENT)
Dept: RADIATION ONCOLOGY | Facility: HOSPITAL | Age: 73
End: 2023-05-19
Payer: MEDICARE

## 2023-05-19 VITALS
TEMPERATURE: 97.5 F | SYSTOLIC BLOOD PRESSURE: 86 MMHG | DIASTOLIC BLOOD PRESSURE: 64 MMHG | RESPIRATION RATE: 20 BRPM | HEART RATE: 68 BPM | OXYGEN SATURATION: 92 %

## 2023-05-19 DIAGNOSIS — R91.8 LUNG MASS: Primary | ICD-10-CM

## 2023-05-19 PROCEDURE — G0463 HOSPITAL OUTPT CLINIC VISIT: HCPCS | Performed by: RADIOLOGY

## 2023-05-19 NOTE — PROGRESS NOTES
Established Patient Visit      Patient: Cecilia Ordoñez   YOB: 1950   Medical Record Number: [id number]   Date of Visit: May 19, 2023   Primary Diagnosis:    Non-small cell carcinoma arising in the right upper lung, stage cT1b cN0 M0.                                            History of Present Illness:   Mrs. Cecilia Ordoñez is a 72-year-old lady with a 1.8 cm right upper nodule.  The patient was seen in initial consultation on 04/06/2023.  Mrs. rOdoñez was felt to be a candidate for stereotactic body radiotherapy.  The patient received 5000 cGy given in 100 cGy  treatment fractions on an alternate day basis from 04/17/2023 through 04/26/2023.  Radiotherapy was well-tolerated.  The patient returns today for follow-up evaluation.    Mrs. Ordoñez states that she is doing well.  She has no side effects referable to her course of SBRT.    Review of Systems   Patient is largely wheelchair-bound.  She has no pulmonary symptomatology or chest pain to report.  No current cardiovascular, gastrointestinal, genitourinary, integumentary, hematopoietic, lymphatic, neurologic, or psychiatric symptomatology.      Past Medical History:   Diagnosis Date   • Anxiety disorder    • GERD (gastroesophageal reflux disease)    • Gout    • Osteoarthritis    • Rheumatoid arthritis    • Skin cancer         Past Surgical History:   Procedure Laterality Date   • BACK SURGERY     • BLADDER REPAIR     • HYSTERECTOMY     • SHOULDER SURGERY        Family History   Problem Relation Age of Onset   • Stroke Mother    • Heart failure Father    • Cancer Paternal Aunt    • Cancer Maternal Grandmother         Social History     Socioeconomic History   • Marital status:    Tobacco Use   • Smoking status: Every Day     Packs/day: 1.00     Types: Cigarettes   • Smokeless tobacco: Never   Vaping Use   • Vaping Use: Never used   Substance and Sexual Activity   • Alcohol use: Never   • Drug use: Never   • Sexual activity: Defer     The  patient is a current tobacco user and approximately 5 minutes was spent in tobacco cessation counseling.      Allergies:  Androgens, Aspirin, Penicillins, Prednisone & diphenhydramine, and Sulfa antibiotics   Prior to Admission medications    Medication Sig Start Date End Date Taking? Authorizing Provider   albuterol sulfate  (90 Base) MCG/ACT inhaler albuterol sulfate HFA 90 mcg/actuation aerosol inhaler   Yes ProviderClare MD   ALPRAZolam (XANAX) 0.25 MG tablet alprazolam 0.25 mg tablet 1/13/23  Yes ProviderClare MD   budesonide-formoterol (SYMBICORT) 160-4.5 MCG/ACT inhaler Q12H 1/13/23  Yes ProviderClare MD   dilTIAZem CD (CARDIZEM CD) 180 MG 24 hr capsule  2/3/23  Yes Provider, MD Clare   ergocalciferol (ERGOCALCIFEROL) 1.25 MG (05822 UT) capsule ergocalciferol (vitamin D2) 1,250 mcg (50,000 unit) capsule   Take 1 capsule twice a week by oral route for 28 days.   Yes Provider, MD Clare   hydroCHLOROthiazide (HYDRODIURIL) 12.5 MG tablet Take 1 tablet by mouth Daily.   Yes Provider, MD Clare   ipratropium-albuterol (Combivent Respimat)  MCG/ACT inhaler Combivent Respimat 20 mcg-100 mcg/actuation solution for inhalation   Yes Provider, MD Clare   lisinopril (PRINIVIL,ZESTRIL) 20 MG tablet lisinopril 20 mg tablet 1/13/23  Yes Provider, MD Clare   meloxicam (MOBIC) 15 MG tablet meloxicam 15 mg tablet 1/13/23  Yes Provider, MD Clare   metoprolol tartrate (LOPRESSOR) 25 MG tablet Every 12 (Twelve) Hours. 1/16/23  Yes ProviderClare MD   oxyCODONE-acetaminophen (PERCOCET) 7.5-325 MG per tablet oxycodone-acetaminophen 7.5 mg-325 mg tablet 1/13/23  Yes Provider, MD Clare   pantoprazole (PROTONIX) 40 MG EC tablet pantoprazole 40 mg tablet,delayed release   Yes ProviderClare MD   potassium chloride (K-DUR,KLOR-CON) 20 MEQ CR tablet potassium chloride ER 20 mEq tablet,extended release(part/cryst)   Yes Provider, Clare, MD       Pain:(on a scale of 0-10)    Pain Score    05/19/23 1101   PainSc: 0-No pain        Cecilia Ordoñez reports a pain score of 0.       Quality of Life:   KPS: 70    Advanced Care Plan: Not discussed     Physical Examination (limited to areas of  of interest due to patient mobility issues).  Vitals:  Blood pressure 86/64 mmHg, pulse 68, respirations 20, afebrile, pulse oximetry on room air 92%   Neck: Short, supple no cervical or periclavicular adenopathy.  Trachea at midline.  No JVD or carotid bruits  Heart: Regular rate and rhythm  Lungs: Clear to auscultation bilaterally  Chest: No radiation induced skin erythema noted over anterior posterior chest walls.    ASSESSMENT:  Stage cT1b cN0 cM0 small cell carcinoma arising in the right upper lung-status post SBRT  Clinically stable    PLAN:  I explained to the patient and her son that Mrs. Ordoñez's post SBRT progress will be assessed by serial chest imaging.  A contrast-enhanced CT scan of chest will be obtained in approximately 1 month.  The patient will return to the cancer center to review results.    Time spent with patient 20 minutes (the total time included previsit review of medical records and imaging studies, and appropriate medical examination/evaluation, documentation of findings in the EMR, and patient counseling).      Electronically signed by Neal Torrez MD 5/19/2023  12:20 EDT

## 2023-06-16 ENCOUNTER — HOSPITAL ENCOUNTER (OUTPATIENT)
Dept: RADIATION ONCOLOGY | Facility: HOSPITAL | Age: 73
Discharge: HOME | End: 2023-06-16
Payer: MEDICARE

## 2023-06-16 DIAGNOSIS — R91.8 LUNG MASS: ICD-10-CM

## 2023-06-16 LAB — CREAT BLDA-MCNC: 0.5 MG/DL (ref 0.6–1.3)

## 2023-06-16 PROCEDURE — 71250 CT THORAX DX C-: CPT | Performed by: RADIOLOGY

## 2023-06-16 PROCEDURE — 71250 CT THORAX DX C-: CPT

## 2023-06-16 PROCEDURE — 82565 ASSAY OF CREATININE: CPT

## 2023-06-19 ENCOUNTER — OFFICE VISIT (OUTPATIENT)
Dept: PULMONOLOGY | Facility: CLINIC | Age: 73
End: 2023-06-19
Payer: MEDICARE

## 2023-06-19 VITALS
RESPIRATION RATE: 18 BRPM | BODY MASS INDEX: 16.3 KG/M2 | HEIGHT: 63 IN | DIASTOLIC BLOOD PRESSURE: 80 MMHG | SYSTOLIC BLOOD PRESSURE: 112 MMHG | TEMPERATURE: 98 F | OXYGEN SATURATION: 98 % | HEART RATE: 75 BPM | WEIGHT: 92 LBS

## 2023-06-19 DIAGNOSIS — J44.9 COPD MIXED TYPE: ICD-10-CM

## 2023-06-19 DIAGNOSIS — R06.09 DOE (DYSPNEA ON EXERTION): ICD-10-CM

## 2023-06-19 DIAGNOSIS — R91.8 LUNG MASS: Primary | ICD-10-CM

## 2023-06-19 DIAGNOSIS — Z72.0 TOBACCO ABUSE: ICD-10-CM

## 2023-06-19 DIAGNOSIS — J96.11 CHRONIC RESPIRATORY FAILURE WITH HYPOXIA: ICD-10-CM

## 2023-06-19 DIAGNOSIS — R91.1 PULMONARY NODULE: ICD-10-CM

## 2023-06-19 RX ORDER — ONDANSETRON 8 MG/1
TABLET, ORALLY DISINTEGRATING ORAL
COMMUNITY

## 2023-06-19 RX ORDER — ACETAMINOPHEN 500 MG
TABLET ORAL
COMMUNITY

## 2023-06-19 NOTE — PROGRESS NOTES
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    History of Present Illness     72-year-old female returns today for follow-up  I last saw her 3/20/2023    She has ongoing tobacco abuse with no plans for smoking cessation    She had a right upper lobe lesion which revealed marked abnormal hypermetabolic activity  Prior bronchoscopy with brush revealed atypia  She is extremely high risk for any more advanced invasive procedures, such as CT FNA  Therefore, she underwent SBRT  She just underwent a follow-up repeat CT scan of the chest with results as noted below     continues to have ongoing lifestyle limiting dyspnea  She uses a wheelchair to get around any distance  She also has a chronic groin, hip, and leg pain  She has a chronic L2 fracture    Patient Active Problem List   Diagnosis   • Severe COPD   • Tobacco abuse   • RUL hypermetabolic lung mass #1 (Atypia on bronch brush) - S/P SBRT   • SANTILLAN (dyspnea on exertion)   • Squamous cell skin (?) cancer with vaginal, rectal involvement   • Severe malnutrition   • Chronic respiratory failure with hypoxia   • RUL Pulmonary nodule #2 - not hypermetabolic 2/2023     Allergies   Allergen Reactions   • Androgens Hives   • Aspirin Nausea And Vomiting   • Penicillins Rash   • Prednisone & Diphenhydramine Hives   • Sulfa Antibiotics Rash and Hives       Current Outpatient Medications:   •  acetaminophen (TYLENOL) 500 MG tablet, acetaminophen 500 mg tablet, Disp: , Rfl:   •  albuterol sulfate  (90 Base) MCG/ACT inhaler, albuterol sulfate HFA 90 mcg/actuation aerosol inhaler, Disp: , Rfl:   •  ALPRAZolam (XANAX) 0.25 MG tablet, alprazolam 0.25 mg tablet, Disp: , Rfl:   •  budesonide-formoterol (SYMBICORT) 160-4.5 MCG/ACT inhaler, Q12H, Disp: , Rfl:   •  ergocalciferol (ERGOCALCIFEROL) 1.25 MG  (97133 UT) capsule, ergocalciferol (vitamin D2) 1,250 mcg (50,000 unit) capsule  Take 1 capsule twice a week by oral route for 28 days., Disp: , Rfl:   •  hydroCHLOROthiazide (HYDRODIURIL) 12.5 MG tablet, Take 1 tablet by mouth Daily., Disp: , Rfl:   •  ipratropium-albuterol (Combivent Respimat)  MCG/ACT inhaler, Combivent Respimat 20 mcg-100 mcg/actuation solution for inhalation, Disp: , Rfl:   •  lisinopril (PRINIVIL,ZESTRIL) 20 MG tablet, lisinopril 20 mg tablet, Disp: , Rfl:   •  meloxicam (MOBIC) 15 MG tablet, meloxicam 15 mg tablet, Disp: , Rfl:   •  metoprolol tartrate (LOPRESSOR) 25 MG tablet, Every 12 (Twelve) Hours., Disp: , Rfl:   •  ondansetron ODT (ZOFRAN-ODT) 8 MG disintegrating tablet, ondansetron 8 mg disintegrating tablet, Disp: , Rfl:   •  oxyCODONE-acetaminophen (PERCOCET) 7.5-325 MG per tablet, 1 tablet. Up to 10MG, Disp: , Rfl:   •  pantoprazole (PROTONIX) 40 MG EC tablet, pantoprazole 40 mg tablet,delayed release, Disp: , Rfl:   •  potassium chloride (K-DUR,KLOR-CON) 20 MEQ CR tablet, potassium chloride ER 20 mEq tablet,extended release(part/cryst), Disp: , Rfl:   •  dilTIAZem CD (CARDIZEM CD) 180 MG 24 hr capsule, , Disp: , Rfl:   MEDICATION LIST AND ALLERGIES REVIEWED.    Family History   Problem Relation Age of Onset   • Stroke Mother    • Heart failure Father    • Cancer Paternal Aunt    • Cancer Maternal Grandmother      Social History     Tobacco Use   • Smoking status: Every Day     Packs/day: 1.00     Years: 62.00     Pack years: 62.00     Types: Cigarettes   • Smokeless tobacco: Never   Vaping Use   • Vaping Use: Never used   Substance Use Topics   • Alcohol use: Never   • Drug use: Never     Social History     Social History Narrative    Ongoing tobacco abuse    Cut down to 1 pack/day    No plans for smoking cessation     FAMILY AND SOCIAL HISTORY REVIEWED.    Review of Systems  IF PRESENT REFER TO SCANNED ROS SHEET FROM SAME DATE  OTHERWISE ROS OBTAINED AND NON-CONTRIBUTORY  "OVER HPI.    /80   Pulse 75   Temp 98 °F (36.7 °C) (Temporal)   Resp 18   Ht 160 cm (63\")   Wt 41.7 kg (92 lb)   SpO2 98%   BMI 16.30 kg/m²   Physical Exam  Vitals and nursing note reviewed.   Constitutional:       General: She is not in acute distress.     Appearance: She is well-developed. She is not diaphoretic.   HENT:      Head: Normocephalic and atraumatic.   Neck:      Thyroid: No thyromegaly.   Cardiovascular:      Rate and Rhythm: Normal rate and regular rhythm.      Heart sounds: Normal heart sounds. No murmur heard.  Pulmonary:      Effort: Pulmonary effort is normal.      Breath sounds: No stridor.      Comments: Decreased breath sounds bilaterally with scattered expiratory wheezes  Musculoskeletal:      Comments: Bilateral lower extremity edema   Lymphadenopathy:      Cervical: No cervical adenopathy.      Upper Body:      Right upper body: No supraclavicular or epitrochlear adenopathy.      Left upper body: No supraclavicular or epitrochlear adenopathy.   Skin:     General: Skin is warm and dry.   Neurological:      Mental Status: She is alert and oriented to person, place, and time.      Comments: Got around the office today in a wheelchair due to fatigue and dyspnea   Psychiatric:         Behavior: Behavior normal.         Results     CT scan of the chest from 6/16/2023 reviewed on PACS  Left-sided pleural-based density with associated cavity appears roughly stable  Most apical right upper lobe nodule appears to have shown some interval decrease in size  A more inferior right upper lobe pulmonary nodule may have shown some interval enlargement in comparison to PET/CT  Stable L2 fracture    Immunization History   Administered Date(s) Administered   • Pneumococcal Conjugate 20-Valent (PCV20) 09/01/2022   • Td 05/20/2010, 03/04/2021   • Td, Not Adsorbed 03/04/2021     Problem List       ICD-10-CM ICD-9-CM   1. RUL hypermetabolic lung mass #1 (Atypia on bronch brush) - S/P SBRT  R91.8 786.6 "   2. RUL Pulmonary nodule #2 - not hypermetabolic 2/2023  R91.1 793.11   3. Severe COPD  J44.9 496   4. SANTILLAN (dyspnea on exertion)  R06.09 786.09   5. Chronic respiratory failure with hypoxia  J96.11 518.83     799.02   6. Tobacco abuse  Z72.0 305.1       Discussion     We reviewed her chest imaging  The more apical right upper lobe pulmonary nodule which exhibited abnormal hypermetabolic activity on prior PET scan has shown some interval decrease in size as expected given her SBRT.    The somewhat more inferior nodule appears to have a different configuration and may be a little bit larger  Some of this may be due to slice selection  It did not exhibit any abnormal hypermetabolic activity on her prior PET scan, 4 months ago    She is going to follow-up with radiation therapy later on this week  Considerations would include getting a PET scan now or short interval CT scan of the chest  Both options were discussed with the patient  She remains an extremely high risk for any type of biopsy procedure    Unfortunately, no plans for smoking cessation    I will see her back in 6 months or earlier if needed    Level of service justified based on 43 minutes spent in patient care on this date of service including, but not limited to: preparing to see the patient, obtaining and/or reviewing history, performing medically appropriate examination, ordering tests/medicine/procedures, independently interpreting results, documenting clinical information in EHR, and counseling/education of patient/family/caregiver (excluding time spent on other separate services such as performing procedures or test interpretation, if applicable). (Level 4 30-39 minutes; Level 5 40-54 minutes)    Manuel Cramer MD  Note electronically signed    CC: Kathryn Schmitt APRN

## 2023-08-22 ENCOUNTER — TELEPHONE (OUTPATIENT)
Dept: RADIATION ONCOLOGY | Facility: HOSPITAL | Age: 73
End: 2023-08-22
Payer: MEDICARE

## 2023-08-22 NOTE — TELEPHONE ENCOUNTER
SPOKE WITH PT SON KEYSHAWN, AND SON STATES PT IS NOW IN NURSING HOME AND ON HOSPICE AND WILL NOT BE ABLE TO COME TO FOLLOW UP WITH DR. SOLO, RADIATION ONCOLOGIST, ON 9/6/23 DUE TO NURSING HOME PLACEMENT AND ON HOSPICE. CANCELLING APPOINTMENT AND SON AWARE IF ANYTHING CHANGES WILL CALL US AND LET US KNOW.

## 2023-10-11 ENCOUNTER — TELEPHONE (OUTPATIENT)
Dept: RADIATION ONCOLOGY | Facility: HOSPITAL | Age: 73
End: 2023-10-11
Payer: MEDICARE

## 2023-10-11 NOTE — TELEPHONE ENCOUNTER
Sherin with [facility] called and stated that the provider pt sees at nursing facility is wanting the pt to follow up with their oncologist. Informed Sherin that this is the Radiation Oncologist and when asked if pt had a Medical Oncologist, Sherin stated didn't think pt did. Informed Sherin that last we were informed that pt was placed in Hospice care. Asked Sherin if pt was still on Hospice and Sherin confirmed pt is still with Hospice.Informed Sherin that since patient is in Hospice care we would not be able to see pt at this time. Sherin stated that will let pts provider know that due to Hospice care we would not be able too see pt at this time. Informed if has any other questions to call us back and Sherin agreed.